# Patient Record
Sex: FEMALE | Race: WHITE | Employment: STUDENT | URBAN - METROPOLITAN AREA
[De-identification: names, ages, dates, MRNs, and addresses within clinical notes are randomized per-mention and may not be internally consistent; named-entity substitution may affect disease eponyms.]

---

## 2019-08-12 ENCOUNTER — OFFICE VISIT (OUTPATIENT)
Dept: ORTHOPEDIC SURGERY | Age: 19
End: 2019-08-12
Payer: COMMERCIAL

## 2019-08-12 VITALS
BODY MASS INDEX: 24.83 KG/M2 | HEIGHT: 62 IN | DIASTOLIC BLOOD PRESSURE: 68 MMHG | WEIGHT: 134.92 LBS | HEART RATE: 72 BPM | SYSTOLIC BLOOD PRESSURE: 106 MMHG

## 2019-08-12 DIAGNOSIS — M22.2X2 PATELLOFEMORAL PAIN SYNDROME OF LEFT KNEE: ICD-10-CM

## 2019-08-12 DIAGNOSIS — M25.562 ACUTE PAIN OF LEFT KNEE: Primary | ICD-10-CM

## 2019-08-12 DIAGNOSIS — M76.32 ILIOTIBIAL BAND SYNDROME OF LEFT SIDE: ICD-10-CM

## 2019-08-12 PROCEDURE — 99203 OFFICE O/P NEW LOW 30 MIN: CPT | Performed by: FAMILY MEDICINE

## 2019-08-12 PROCEDURE — MISCD110 LATERAL STABILIZER: Performed by: FAMILY MEDICINE

## 2019-08-12 RX ORDER — DICLOFENAC SODIUM 75 MG/1
75 TABLET, DELAYED RELEASE ORAL 2 TIMES DAILY
Qty: 60 TABLET | Refills: 3 | Status: SHIPPED | OUTPATIENT
Start: 2019-08-12

## 2019-08-12 RX ORDER — METHYLPREDNISOLONE 4 MG/1
TABLET ORAL
Qty: 21 KIT | Refills: 0 | Status: SHIPPED | OUTPATIENT
Start: 2019-08-12

## 2019-08-13 NOTE — PROGRESS NOTES
Chief Complaint  Knee Pain (N LEFT KNEE)      She will consultation ongoing left knee pain with mild pseudo-buckling and pes planus    History of Present Illness:  Danielle Streeter is a 19 y.o. female is a very pleasant white female incoming yovani  for [employer] who plays primarily second and third base is being seen today upon self-referral for evaluation of ongoing pain to the anterior lateral portion of her left knee. She states that she has been having ongoing progressive knee pain to the anterior lateral portion of her left knee since roughly June 2019. She did play a financial number of games at least 50 during the spring with softball with her college and also played 20-30 games this summer. The onset of her symptoms was rather gradual and while she did have several collisions she cannot really recall a definitive injury to her knee. Her pain has been accompanied by crepitation anteriorly and tightness laterally. She does have an achy pain at 2-3 out of 10 with more sharp 5-6 out of 10 pain laterally with prolonged running sprinting and pivoting. She has been trying to do some running as she will start back at her new college on 8/22/2019 and needs to run at least an 8-minute mile. She is fairly tight and outside of some ice and stretching is a little in the way of treatment. She has not been taking her medications she is not really complaining of high-grade pseudo-buckling nor she has been having locking or catching with only mild crepitation. She has no previous history of knee injury. She is being seen today for orthopedic and sports consultation with imaging. Medical History     Patient's medications, allergies, past medical, surgical, social and family histories were reviewed and updated as appropriate.     Review of Systems  Pertinent items are noted in HPI  Review of systems reviewed from Patient History Form dated on 8/12/2019 and available in the patient's chart under the Media tab. Vital Signs  Vitals:    08/12/19 1343   BP: 106/68   Pulse: 72       General Exam:     Constitutional: Patient is adequately groomed with no evidence of malnutrition  DTRs: Deep tendon reflexes are intact  Mental Status: The patient is oriented to time, place and person. The patient's mood and affect are appropriate. Lymphatic: The lymphatic examination bilaterally reveals all areas to be without enlargement or induration. Vascular: Examination reveals no swelling or calf tenderness. Peripheral pulses are palpable and 2+. Neurological: The patient has good coordination. There is no weakness or sensory deficit. Knee Examination  Inspection: There is no high-grade deformity or tense effusion. She does have some mild patellofemoral hypermobility and mild patellar crepitation. Palpation: She does have tenderness over the lateral greater than medial patellofemoral facet and does have some pain with patellar grind testing lateral IT band as well as over the central lateral joint line. No medial tenderness. Rang of Motion: Full active and passive range of motion but is fairly tight with regard to her hamstrings and IT bands. Strength:4 alden out of 5 with flexion and extension. Special Tests: She does have pain patellar grind testing. Negative apprehension testing. Discomfort with lateral Delaney's but no appreciable click. She is tight with regard to her IT band. Trace positive distal Vero's. I do not sense high-grade instability and screening hip testing is benign. Skin: There are no rashes, ulcerations or lesions. Distal neurovascular exam is intact. Gait: Fluid smooth gait. She is a mild over pronator. Reflex symmetrically preserved    Additional Comments:     Additional Examinations:  Contralateral Exam: Examination of the left knee reveals intact skin. There is no focal tenderness.   The patient demonstrates full painless range of motion with regards to flexion

## 2019-08-14 ENCOUNTER — TELEPHONE (OUTPATIENT)
Dept: PHYSICAL THERAPY | Age: 19
End: 2019-08-14

## 2019-08-14 ENCOUNTER — HOSPITAL ENCOUNTER (OUTPATIENT)
Dept: PHYSICAL THERAPY | Age: 19
Setting detail: THERAPIES SERIES
Discharge: HOME | End: 2019-08-14
Payer: COMMERCIAL

## 2019-08-14 PROCEDURE — 97161 PT EVAL LOW COMPLEX 20 MIN: CPT | Performed by: PHYSICAL THERAPIST

## 2019-08-14 PROCEDURE — 97112 NEUROMUSCULAR REEDUCATION: CPT | Performed by: PHYSICAL THERAPIST

## 2019-08-14 PROCEDURE — 97110 THERAPEUTIC EXERCISES: CPT | Performed by: PHYSICAL THERAPIST

## 2019-08-14 NOTE — PLAN OF CARE
understands and demonstrates independence and proper form with HEP in order to prevent re-injury. 2. Patient will have a decrease in pain to facilitate improvement in movement, function, and ADLs as indicated by Functional Deficits.       Electronically signed by:  Elizabeth Reid PT

## 2021-06-15 ENCOUNTER — CLINICAL DOCUMENTATION (OUTPATIENT)
Dept: OTHER | Age: 21
End: 2021-06-15